# Patient Record
Sex: MALE | Race: BLACK OR AFRICAN AMERICAN | Employment: UNEMPLOYED | URBAN - METROPOLITAN AREA
[De-identification: names, ages, dates, MRNs, and addresses within clinical notes are randomized per-mention and may not be internally consistent; named-entity substitution may affect disease eponyms.]

---
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
## 2024-08-05 ENCOUNTER — HOSPITAL ENCOUNTER (INPATIENT)
Facility: HOSPITAL | Age: 24
LOS: 3 days | Discharge: HOME | End: 2024-08-09
Attending: EMERGENCY MEDICINE | Admitting: STUDENT IN AN ORGANIZED HEALTH CARE EDUCATION/TRAINING PROGRAM
Payer: COMMERCIAL

## 2024-08-05 DIAGNOSIS — F31.63 BIPOLAR 1 DISORDER, MIXED, SEVERE (HCC): ICD-10-CM

## 2024-08-05 DIAGNOSIS — F29 PSYCHOSIS, UNSPECIFIED PSYCHOSIS TYPE (HCC): ICD-10-CM

## 2024-08-05 DIAGNOSIS — F30.9 MANIA (HCC): Primary | ICD-10-CM

## 2024-08-05 DIAGNOSIS — R45.851 SUICIDAL IDEATION: ICD-10-CM

## 2024-08-05 DIAGNOSIS — R74.01 TRANSAMINITIS: ICD-10-CM

## 2024-08-05 LAB
ALBUMIN SERPL-MCNC: 4.6 G/DL (ref 3.5–5)
ALBUMIN/GLOB SERPL: 1.3 (ref 1.1–2.2)
ALP SERPL-CCNC: 143 U/L (ref 45–117)
ALT SERPL-CCNC: 165 U/L (ref 12–78)
AMPHET UR QL SCN: NEGATIVE
ANION GAP SERPL CALC-SCNC: 15 MMOL/L (ref 5–15)
APAP SERPL-MCNC: <2 UG/ML (ref 10–30)
APPEARANCE UR: CLEAR
AST SERPL-CCNC: 125 U/L (ref 15–37)
BACTERIA URNS QL MICRO: NEGATIVE /HPF
BARBITURATES UR QL SCN: NEGATIVE
BASOPHILS # BLD: 0 K/UL (ref 0–0.1)
BASOPHILS NFR BLD: 0 % (ref 0–1)
BENZODIAZ UR QL: NEGATIVE
BILIRUB SERPL-MCNC: 0.7 MG/DL (ref 0.2–1)
BILIRUB UR QL: NEGATIVE
BUN SERPL-MCNC: 13 MG/DL (ref 6–20)
BUN/CREAT SERPL: 10 (ref 12–20)
CALCIUM SERPL-MCNC: 9.4 MG/DL (ref 8.5–10.1)
CANNABINOIDS UR QL SCN: POSITIVE
CHLORIDE SERPL-SCNC: 103 MMOL/L (ref 97–108)
CO2 SERPL-SCNC: 25 MMOL/L (ref 21–32)
COCAINE UR QL SCN: NEGATIVE
COLOR UR: ABNORMAL
CREAT SERPL-MCNC: 1.31 MG/DL (ref 0.7–1.3)
DIFFERENTIAL METHOD BLD: NORMAL
EOSINOPHIL # BLD: 0 K/UL (ref 0–0.4)
EOSINOPHIL NFR BLD: 0 % (ref 0–7)
EPITH CASTS URNS QL MICRO: ABNORMAL /LPF
ERYTHROCYTE [DISTWIDTH] IN BLOOD BY AUTOMATED COUNT: 12.9 % (ref 11.5–14.5)
ETHANOL SERPL-MCNC: 82 MG/DL (ref 0–0.08)
GLOBULIN SER CALC-MCNC: 3.5 G/DL (ref 2–4)
GLUCOSE SERPL-MCNC: 65 MG/DL (ref 65–100)
GLUCOSE UR STRIP.AUTO-MCNC: NEGATIVE MG/DL
HCT VFR BLD AUTO: 42.4 % (ref 36.6–50.3)
HGB BLD-MCNC: 14.7 G/DL (ref 12.1–17)
HGB UR QL STRIP: NEGATIVE
IMM GRANULOCYTES # BLD AUTO: 0 K/UL (ref 0–0.04)
IMM GRANULOCYTES NFR BLD AUTO: 0 % (ref 0–0.5)
KETONES UR QL STRIP.AUTO: ABNORMAL MG/DL
LEUKOCYTE ESTERASE UR QL STRIP.AUTO: NEGATIVE
LYMPHOCYTES # BLD: 1.1 K/UL (ref 0.8–3.5)
LYMPHOCYTES NFR BLD: 24 % (ref 12–49)
Lab: ABNORMAL
MCH RBC QN AUTO: 28.2 PG (ref 26–34)
MCHC RBC AUTO-ENTMCNC: 34.7 G/DL (ref 30–36.5)
MCV RBC AUTO: 81.4 FL (ref 80–99)
METHADONE UR QL: NEGATIVE
MONOCYTES # BLD: 0.5 K/UL (ref 0–1)
MONOCYTES NFR BLD: 11 % (ref 5–13)
NEUTS SEG # BLD: 3 K/UL (ref 1.8–8)
NEUTS SEG NFR BLD: 65 % (ref 32–75)
NITRITE UR QL STRIP.AUTO: NEGATIVE
NRBC # BLD: 0 K/UL (ref 0–0.01)
NRBC BLD-RTO: 0 PER 100 WBC
OPIATES UR QL: NEGATIVE
PCP UR QL: NEGATIVE
PH UR STRIP: 6.5 (ref 5–8)
PLATELET # BLD AUTO: 290 K/UL (ref 150–400)
PMV BLD AUTO: 9.6 FL (ref 8.9–12.9)
POTASSIUM SERPL-SCNC: 3.5 MMOL/L (ref 3.5–5.1)
PROT SERPL-MCNC: 8.1 G/DL (ref 6.4–8.2)
PROT UR STRIP-MCNC: ABNORMAL MG/DL
RBC # BLD AUTO: 5.21 M/UL (ref 4.1–5.7)
RBC #/AREA URNS HPF: ABNORMAL /HPF (ref 0–5)
SALICYLATES SERPL-MCNC: 2 MG/DL (ref 2.8–20)
SODIUM SERPL-SCNC: 143 MMOL/L (ref 136–145)
SP GR UR REFRACTOMETRY: 1.02
URINE CULTURE IF INDICATED: ABNORMAL
UROBILINOGEN UR QL STRIP.AUTO: 1 EU/DL (ref 0.2–1)
WBC # BLD AUTO: 4.6 K/UL (ref 4.1–11.1)
WBC URNS QL MICRO: ABNORMAL /HPF (ref 0–4)

## 2024-08-05 PROCEDURE — 80307 DRUG TEST PRSMV CHEM ANLYZR: CPT

## 2024-08-05 PROCEDURE — 82077 ASSAY SPEC XCP UR&BREATH IA: CPT

## 2024-08-05 PROCEDURE — 80053 COMPREHEN METABOLIC PANEL: CPT

## 2024-08-05 PROCEDURE — 81001 URINALYSIS AUTO W/SCOPE: CPT

## 2024-08-05 PROCEDURE — 90715 TDAP VACCINE 7 YRS/> IM: CPT | Performed by: EMERGENCY MEDICINE

## 2024-08-05 PROCEDURE — 85025 COMPLETE CBC W/AUTO DIFF WBC: CPT

## 2024-08-05 PROCEDURE — 6360000002 HC RX W HCPCS: Performed by: EMERGENCY MEDICINE

## 2024-08-05 PROCEDURE — 6370000000 HC RX 637 (ALT 250 FOR IP): Performed by: EMERGENCY MEDICINE

## 2024-08-05 PROCEDURE — 90471 IMMUNIZATION ADMIN: CPT | Performed by: EMERGENCY MEDICINE

## 2024-08-05 PROCEDURE — 99285 EMERGENCY DEPT VISIT HI MDM: CPT

## 2024-08-05 PROCEDURE — 80143 DRUG ASSAY ACETAMINOPHEN: CPT

## 2024-08-05 PROCEDURE — 80179 DRUG ASSAY SALICYLATE: CPT

## 2024-08-05 RX ORDER — GINSENG 100 MG
CAPSULE ORAL
Status: COMPLETED | OUTPATIENT
Start: 2024-08-05 | End: 2024-08-05

## 2024-08-05 RX ORDER — OLANZAPINE 5 MG/1
10 TABLET ORAL ONCE
Status: COMPLETED | OUTPATIENT
Start: 2024-08-05 | End: 2024-08-05

## 2024-08-05 RX ADMIN — OLANZAPINE 10 MG: 5 TABLET, FILM COATED ORAL at 18:16

## 2024-08-05 RX ADMIN — BACITRACIN: 500 OINTMENT TOPICAL at 20:52

## 2024-08-05 RX ADMIN — TETANUS TOXOID, REDUCED DIPHTHERIA TOXOID AND ACELLULAR PERTUSSIS VACCINE, ADSORBED 0.5 ML: 5; 2.5; 8; 8; 2.5 SUSPENSION INTRAMUSCULAR at 16:48

## 2024-08-05 ASSESSMENT — PAIN SCALES - GENERAL: PAINLEVEL_OUTOF10: 0

## 2024-08-05 ASSESSMENT — PAIN - FUNCTIONAL ASSESSMENT: PAIN_FUNCTIONAL_ASSESSMENT: 0-10
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
## 2024-08-05 NOTE — ED NOTES
Assumed constant observer responsibilities at this time. 1:1 constant observer to patient ratio maintained. Patient is present in treatment room 10 in bed.     Patient requires constant observation due to SI and requires q15 minute documentation. Environmental check performed; all ligature risk removed and primary RN notified of any environmental safety concerns.

## 2024-08-05 NOTE — ED NOTES
Pt presents ambulatory to ED with [place] under ECO complaining of suicidal ideation and auditory hallucinations. Pt also endorses Spiritism delusions. Pt reports he has intermittently been taking Zoloft. Pt endorses alcohol, marijuana, and prescription drug use. Pt calm and cooperative with RN during triage and assessment. Pt endorses suicidal ideation and states he has everything planned out but does not necessarily want to carry out plan. Pt is alert and oriented x 4, RR even and unlabored, skin is warm and dry. Assesment completed and pt updated on plan of care.     ECO originally executed by mother stating that patient has not been bathing self, he is manic, and has suicidal ideation.       Emergency Department Nursing Plan of Care       The Nursing Plan of Care is developed from the Nursing assessment and Emergency Department Attending provider initial evaluation.  The plan of care may be reviewed in the “ED Provider note”.    The Plan of Care was developed with the following considerations:   Patient / Family readiness to learn indicated by:verbalized understanding  Persons(s) to be included in education: patient  Barriers to Learning/Limitations:None    Signed
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
## 2024-08-06 PROBLEM — F29 PSYCHOSIS, UNSPECIFIED PSYCHOSIS TYPE (HCC): Status: RESOLVED | Noted: 2024-08-06 | Resolved: 2024-08-06

## 2024-08-06 PROBLEM — F31.63 BIPOLAR 1 DISORDER, MIXED, SEVERE (HCC): Status: ACTIVE | Noted: 2024-08-06

## 2024-08-06 PROBLEM — F29 PSYCHOSIS, UNSPECIFIED PSYCHOSIS TYPE (HCC): Status: ACTIVE | Noted: 2024-08-06

## 2024-08-06 PROCEDURE — 99223 1ST HOSP IP/OBS HIGH 75: CPT | Performed by: PSYCHIATRY & NEUROLOGY

## 2024-08-06 PROCEDURE — 6370000000 HC RX 637 (ALT 250 FOR IP): Performed by: PSYCHIATRY & NEUROLOGY

## 2024-08-06 PROCEDURE — 6370000000 HC RX 637 (ALT 250 FOR IP)

## 2024-08-06 PROCEDURE — 1240000000 HC EMOTIONAL WELLNESS R&B

## 2024-08-06 RX ORDER — DIPHENHYDRAMINE HYDROCHLORIDE 50 MG/ML
50 INJECTION INTRAMUSCULAR; INTRAVENOUS EVERY 4 HOURS PRN
Status: DISCONTINUED | OUTPATIENT
Start: 2024-08-06 | End: 2024-08-09 | Stop reason: HOSPADM

## 2024-08-06 RX ORDER — POLYETHYLENE GLYCOL 3350 17 G/17G
17 POWDER, FOR SOLUTION ORAL DAILY PRN
Status: DISCONTINUED | OUTPATIENT
Start: 2024-08-06 | End: 2024-08-09 | Stop reason: HOSPADM

## 2024-08-06 RX ORDER — VALPROIC ACID 250 MG/5ML
500 SOLUTION ORAL 2 TIMES DAILY
Status: DISCONTINUED | OUTPATIENT
Start: 2024-08-06 | End: 2024-08-09 | Stop reason: HOSPADM

## 2024-08-06 RX ORDER — ACETAMINOPHEN 325 MG/1
650 TABLET ORAL EVERY 4 HOURS PRN
Status: DISCONTINUED | OUTPATIENT
Start: 2024-08-06 | End: 2024-08-09 | Stop reason: HOSPADM

## 2024-08-06 RX ORDER — POLYETHYLENE GLYCOL 3350 17 G
2 POWDER IN PACKET (EA) ORAL
Status: DISCONTINUED | OUTPATIENT
Start: 2024-08-06 | End: 2024-08-09 | Stop reason: HOSPADM

## 2024-08-06 RX ORDER — TRAZODONE HYDROCHLORIDE 50 MG/1
50 TABLET ORAL NIGHTLY PRN
Status: DISCONTINUED | OUTPATIENT
Start: 2024-08-06 | End: 2024-08-09 | Stop reason: HOSPADM

## 2024-08-06 RX ORDER — RISPERIDONE 1 MG/ML
1 SOLUTION ORAL 2 TIMES DAILY
Status: DISCONTINUED | OUTPATIENT
Start: 2024-08-06 | End: 2024-08-09 | Stop reason: HOSPADM

## 2024-08-06 RX ORDER — HALOPERIDOL 5 MG/ML
5 INJECTION INTRAMUSCULAR EVERY 4 HOURS PRN
Status: DISCONTINUED | OUTPATIENT
Start: 2024-08-06 | End: 2024-08-09 | Stop reason: HOSPADM

## 2024-08-06 RX ORDER — LORAZEPAM 2 MG/ML
1 CONCENTRATE ORAL 2 TIMES DAILY
Status: DISCONTINUED | OUTPATIENT
Start: 2024-08-06 | End: 2024-08-09 | Stop reason: HOSPADM

## 2024-08-06 RX ORDER — HYDROXYZINE HYDROCHLORIDE 25 MG/1
50 TABLET, FILM COATED ORAL 3 TIMES DAILY PRN
Status: DISCONTINUED | OUTPATIENT
Start: 2024-08-06 | End: 2024-08-09 | Stop reason: HOSPADM

## 2024-08-06 RX ORDER — HALOPERIDOL 5 MG/1
5 TABLET ORAL EVERY 4 HOURS PRN
Status: DISCONTINUED | OUTPATIENT
Start: 2024-08-06 | End: 2024-08-09 | Stop reason: HOSPADM

## 2024-08-06 RX ORDER — MAGNESIUM HYDROXIDE/ALUMINUM HYDROXICE/SIMETHICONE 120; 1200; 1200 MG/30ML; MG/30ML; MG/30ML
30 SUSPENSION ORAL EVERY 6 HOURS PRN
Status: DISCONTINUED | OUTPATIENT
Start: 2024-08-06 | End: 2024-08-09 | Stop reason: HOSPADM

## 2024-08-06 RX ADMIN — NICOTINE POLACRILEX 2 MG: 2 LOZENGE ORAL at 19:50

## 2024-08-06 RX ADMIN — RISPERIDONE 1 MG: 1 SOLUTION ORAL at 11:22

## 2024-08-06 RX ADMIN — LORAZEPAM 1 MG: 2 SOLUTION, CONCENTRATE ORAL at 20:48

## 2024-08-06 RX ADMIN — VALPROIC ACID 500 MG: 250 SOLUTION ORAL at 20:48

## 2024-08-06 RX ADMIN — TRAZODONE HYDROCHLORIDE 50 MG: 50 TABLET ORAL at 20:48

## 2024-08-06 RX ADMIN — RISPERIDONE 1 MG: 1 SOLUTION ORAL at 20:48

## 2024-08-06 RX ADMIN — VALPROIC ACID 500 MG: 250 SOLUTION ORAL at 11:21

## 2024-08-06 RX ADMIN — LORAZEPAM 1 MG: 2 SOLUTION, CONCENTRATE ORAL at 11:22

## 2024-08-06 ASSESSMENT — LIFESTYLE VARIABLES
HOW MANY STANDARD DRINKS CONTAINING ALCOHOL DO YOU HAVE ON A TYPICAL DAY: 1 OR 2
HOW OFTEN DO YOU HAVE A DRINK CONTAINING ALCOHOL: 2-3 TIMES A WEEK

## 2024-08-06 ASSESSMENT — SLEEP AND FATIGUE QUESTIONNAIRES
SLEEP PATTERN: DIFFICULTY FALLING ASLEEP
DO YOU USE A SLEEP AID: NO
DO YOU HAVE DIFFICULTY SLEEPING: YES
AVERAGE NUMBER OF SLEEP HOURS: 5

## 2024-08-06 ASSESSMENT — PAIN SCALES - GENERAL: PAINLEVEL_OUTOF10: 0

## 2024-08-06 NOTE — GROUP NOTE
Group Therapy Note    Date: 8/6/2024    Group Start Time: 1500  Group End Time: 1600  Group Topic: Recreational    [facility] ACUTE BEHAV HLTH    Brianna Geiger        Group Therapy Note    Attendees: 6       Patient's Goal:  To concentrate on selected task    Notes:  Pt did not attend session    Discipline Responsible: Recreational Therapist      Signature:  BRIANNA GEIGER

## 2024-08-06 NOTE — ED NOTES
Called bed board to update them on pt's vitals. Pt awake in room and appears upset that he is having to stay.
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
## 2024-08-06 NOTE — PROGRESS NOTES
Laboratory Monitoring for Mood Stabilizers and Antipsychotics    Recommended baseline monitoring has not been completed based on this patient's current medication regimen.    The following labs have been ordered to complete baseline monitoring:  Lipid Panel, Hemoglobin A1c, TSH. Labs will be drawn on 8/12 with VPA level to minimize blood draws.      This patient is currently prescribed the following medication(s):   Current Facility-Administered Medications: acetaminophen (TYLENOL) tablet 650 mg, 650 mg, Oral, Q4H PRN  polyethylene glycol (GLYCOLAX) packet 17 g, 17 g, Oral, Daily PRN  aluminum & magnesium hydroxide-simethicone (MAALOX) 200-200-20 MG/5ML suspension 30 mL, 30 mL, Oral, Q6H PRN  hydrOXYzine HCl (ATARAX) tablet 50 mg, 50 mg, Oral, TID PRN  haloperidol (HALDOL) tablet 5 mg, 5 mg, Oral, Q4H PRN **OR** haloperidol lactate (HALDOL) injection 5 mg, 5 mg, IntraMUSCular, Q4H PRN  diphenhydrAMINE (BENADRYL) injection 50 mg, 50 mg, IntraMUSCular, Q4H PRN  traZODone (DESYREL) tablet 50 mg, 50 mg, Oral, Nightly PRN  nicotine polacrilex (COMMIT) lozenge 2 mg, 2 mg, Oral, Q1H PRN  risperiDONE (RisperDAL) 1 MG/ML oral solution 1 mg, 1 mg, Oral, BID  LORazepam (ATIVAN) 2 MG/ML concentrated solution 1 mg, 1 mg, Oral, BID  valproic acid (DEPAKENE) 250 MG/5ML oral solution 500 mg, 500 mg, Oral, BID    The following labs have been completed for monitoring of antipsychotics and/or mood stabilizers:    Height, Weight, BMI Estimation  Estimated body mass index is 20.22 kg/m² as calculated from the following:    Height as of this encounter: 1.803 m (5' 11\").    Weight as of this encounter: 65.8 kg (145 lb).     Vital Signs/Blood Pressure  BP (!) 116/58   Pulse 59   Temp 97.5 °F (36.4 °C) (Oral)   Resp 16   Ht 1.803 m (5' 11\")   Wt 65.8 kg (145 lb)   SpO2 100%   BMI 20.22 kg/m²     Renal Function, Hepatic Function and Chemistry  Estimated Creatinine Clearance: 82 mL/min (A) (based on SCr of 1.31 mg/dL (H)).    Lab

## 2024-08-06 NOTE — CARE COORDINATION
08/06/24 0855   Suicidal Ideation   Wish to be Dead Lifetime - Yes;Past 1 month - Yes   Non-Specific Active Suicidal Thoughts Lifetime - Yes;Past 1 month - Yes   Suicidal Behavior Trigger No Selected   Active Suicidal Ideation with Any Methods (Not Plan) without Intent to Act Lifetime - No;Past 1 month - No   Active Suicidal Ideation with Some Intent to Act, without Specific Plan Lifetime - No;Past 1 month - No   Active Suicidal Ideation with Specific Plan and Intent Past 1 month - No;Lifetime- No   Intensity of Ideation   Lifetime - Most Severe Ideation 4   Lifetime - Most Recent Ideation 3   Frequency Once a week   Duration Less than 1 hour/some of the time   Controllability Can control thoughts with some difficulty   Deterrents Uncertain that deterrents stopped you   Reasons for Ideation Mostly to end or stop the pain   Suicidal Behavior   Actual Attempt Lifetime - No;Past 3 months - No   Has subject engaged in Non-Suicidal Self-Injurious Behavior? Past 3 months - Yes;Lifetime - Yes   Interrupted Attempt Lifetime - No;Past 3 months - No   Aborted or Self-Interrupted Attempt Past 3 months - No;Lifetime - No   Preparatory Acts or Behavior Lifetime - No;Past 3 months - No

## 2024-08-06 NOTE — GROUP NOTE
Group Therapy Note    Date: 8/6/2024    Group Start Time: 1100  Group End Time: 1200  Group Topic: Relaxation    [facility] 3 ACUTE BEHAV [facility]    Brianna Geiger        Group Therapy Note    Attendees: 9       Patient's Goal:  To participate in relaxation activity      Status After Intervention:  Improved    Participation Level: Active Listener and Interactive    Participation Quality: Appropriate, Attentive, and Sharing      Speech:  normal      Thought Process/Content: Logical      Affective Functioning: Congruent      Level of consciousness:  Alert, Oriented x4, and Attentive      Response to Learning: Progressing to goal      Endings: None Reported    Modes of Intervention: Activity      Discipline Responsible: Recreational Therapist      Signature:  BRIANNA GEIGER

## 2024-08-06 NOTE — BH NOTE
Day Shift Assessment ([id number]):    Writer met patient in his room. Patient noted to be resting quietly, asleep in bed. Patient appears to be free of distress. Patient groggy, but easily aroused upon prompting. Patient minimally cooperative, falling asleep during assessment. Patient A/O x 4 with clear speech and steady ambulation when out of bed. Patient presents with an elevated expression/affect and anxious mood. Patient denies depression, SI, HI, and VH. Patient endorses AH described as hearing his name. Patient scored \"No Risk\" on C-SSRS for this shift. Patient's speech is pressured, thought process tangential. Patient is meal and medication compliant patient visible in the milieu, social with staff and peers. Patient is discharge focused, minimally receptive to education on TDO process. Patient repeatedly demands to have his rights honored. Writer attempted to educate patient on [facility] policies. No evidence of learning.VS stable. Plan of care in place. Q 15 minute checks in place for safety.

## 2024-08-06 NOTE — CARE COORDINATION
08/06/24 0856   ITP   Date of Plan 08/06/24   Primary Diagnosis Code Psychosis, unspecified psychosis type   Barriers to Treatment Need for psychoeducation;Psychiatric symptom (comment);Client resistance   Strengths Incorporated in Plan Support network;Verbal;Family supports   Plan of Care   Long Term Goal (LTG) Stated in patient/guardian terms Maintain safety and stability in the community   Short Term Goal 1   Short Term Goal 1 Client will reduce frequency and intensity of unsafe behavior   Baseline Functioning Client insight and judgement is psychologically impaired and he is engaging in self injurous behavior   Target Reduction in self-injurious behavior and improvement in insight and judgement   Objectives Client will participate in group therapy;Other (comment)  (Client will participate in treatment team)   Intervention 1 Milieu therapy and support   Frequency On going   Measured by Staff observation;Self report   Staff Responsible Clinical staff;[facility] staff   Intervention 2 Monitor medications   Frequency On going   Measured by Staff observation;Self report   Staff Responsible Clinical staff;[facility] staff   STG Goal 1 Status: Patient Appears to be  Treatment plan goal is unmet at this time  (Client judgement and insight remains severely psychologically impaired)   Short Term Goal 2   Short Term Goal 2 Client will learn and demonstrate effective coping skills   Baseline Functioning Client is experiencing SI due to hallucinations   Target Reduction in ideation through the use of coping skills   Objectives Client will participate in group therapy;Other (comment)  (Client will participate in treatment team)   Intervention 1 Milieu therapy and support   Frequency On going   Measured by Staff observation;Self report   Staff Responsible Clinical staff;[facility] staff   Intervention 2 Referral to community services   Frequency Prior to discharge   Measured by Staff observation;Self report   Staff Responsible Clinical  staff;[facility] staff   STG Goal 2 Status: Patient Appears to be  Treatment plan goal is unmet at this time  (Client judgement and insight are impaired and client is unable to use healthy coping skills top cope)   Crisis/Safety/Discharge Plan   Crisis/Safety Plan Standard program interventions and protocol   Comprehensive Assessment Completion Date 08/06/24   Discharge Plan Home     CROW Gandhi

## 2024-08-06 NOTE — BH NOTE
[facility]  Admission Note     Patient identifies as non-binary, stated preferred pronouns are they, he, and she.     Admission Type:    Patient admitted as TDO.     Reason for admission:   Patient states his neighbors called the police on them when they had an argument with their wife. Per EMR patient was brought to the ER under ECO after poor eating and sleeping, texting girlfriend excessively, and punching a hole in their bedroom wall as reported by mother.     UDS positive for THC. BAL 82 at 8/5/24 1747.    Patient endorses SI, denies HI, and visual hallucination. Endorsed auditory hallucinations of his name being called in music. Patient presented as cooperative. Patient thought process circumstantial, loose association, and disorganized. Patient endorsed taking Zoloft 100mg and \"weaning self off\", and stated that he was not under the care of a provider.           Medical Problems:   Patient endorses history of asthma.     Status EXAM:  Mental Status and Behavioral Exam  Normal: No  Level of Assistance: Independent/Self  Facial Expression: Exaggerated  Affect: Appropriate  Level of Consciousness: Alert  Frequency of Checks: 4 times per hour, close  Mood:Normal: Yes  Motor Activity:Normal: Yes  Eye Contact: Good  Observed Behavior: Cooperative, Impulsive  Sexual Misconduct History: Current - no  Preception: [place] to person, [place] to place, [place] to situation, [place] to time  Attention:Normal: Yes  Thought Processes: Circumstantial, Loose association  Thought Content:Normal: Yes  Depression Symptoms: Sleep disturbance  Anxiety Symptoms: Generalized  Sharon Symptoms: No problems reported or observed.  Hallucinations: Auditory (comment)  Delusions: No  Memory:Normal: Yes  Insight and Judgment: No  Insight and Judgment: Poor judgment, Poor insight    Pt admitted with followings belongings:  Dental Appliances: None  Vision - Corrective Lenses: None  Hearing Aid: None  Jewelry: Necklace  Body  Piercings Removed: N/A  Clothing: Undergarments, Shirt, Pants, Footwear  Other Valuables: Other (Comment)    Skin Assessment completed by TANIA Fernandez and TANIA Gray. Old scars present on L shoulder, bilateral lower legs, L elbow, L elbow, and L thigh. New superficial abrasion present on R bicep, patient states from hitting a door on 8/5.      Valuables placed in safe in security envelope. Patient oriented to surroundings and program expectations and copy of patient rights given. Received admission packet. Patient verbalize understanding.

## 2024-08-06 NOTE — ED NOTES
Cords, bag masks and wires removed from room. Waist  beads removed from pt due to ligature risk and placed in pt belonging bags.

## 2024-08-06 NOTE — ED NOTES
TRANSFER - OUT REPORT:    Verbal report given to Jim MARIN on Isacc Boone  being transferred to U 308 for routine progression of patient care       Report consisted of patient's Situation, Background, Assessment and   Recommendations(SBAR).     Information from the following report(s) Nurse Handoff Report, ED Encounter Summary, ED SBAR, MAR, and Recent Results was reviewed with the receiving nurse.    Sharples Fall Assessment:    Presents to emergency department  because of falls (Syncope, seizure, or loss of consciousness): No  Age > 70: No  Altered Mental Status, Intoxication with alcohol or substance confusion (Disorientation, impaired judgment, poor safety awaremess, or inability to follow instructions): No  Impaired Mobility: Ambulates or transfers with assistive devices or assistance; Unable to ambulate or transer.: No  Nursing Judgement: No          Lines:       Opportunity for questions and clarification was provided.      Patient transported with:  security

## 2024-08-06 NOTE — PLAN OF CARE
Problem: Discharge Planning  Goal: Discharge to home or other facility with appropriate resources  8/6/2024 1310 by Argenis Walker, RN  Outcome: Not Progressing  8/6/2024 0502 by Marina Costa, RN  Outcome: Progressing     Problem: Behavior  Goal: Pt/Family maintain appropriate behavior and adhere to behavioral management agreement, if implemented  Description: INTERVENTIONS:  1. Assess patient/family's coping skills and  non-compliant behavior (including use of illegal substances)  2. Notify security of behavior or suspected illegal substances which indicate the need for search of the family and/or belongings  3. Encourage verbalization of thoughts and concerns in a socially appropriate manner  4. Utilize positive, consistent limit setting strategies supporting safety of patient, staff and others  5. Encourage participation in the decision making process about the behavioral management agreement  6. If a visitor's behavior poses a threat to safety call refer to organization policy.  7. Initiate consult with , Psychosocial CNS, Spiritual Care as appropriate  Outcome: Not Progressing     Problem: Sharon  Goal: Will exhibit normal sleep and speech and no impulsivity  Description: INTERVENTIONS:  1. Administer medication as ordered  2. Set limits on impulsive behavior  3. Make attempts to decrease external stimuli as possible  Outcome: Not Progressing     Problem: Psychosis  Goal: Will report no hallucinations or delusions  Description: INTERVENTIONS:  1. Administer medication as  ordered  2. Assist with reality testing to support increasing orientation  3. Assess if patient's hallucinations or delusions are encouraging self harm or harm to others and intervene as appropriate  Outcome: Not Progressing

## 2024-08-06 NOTE — ED NOTES
Pt upset that he cannot use cell phone to call girlfriend. Pt saw gf and mother earlier this shift. RN says that they know he is here and safe. RN offered to call and leave a message, but pt does not have their phone numbers memorized. This RN looked through chart to find them, but could not.

## 2024-08-06 NOTE — H&P
INITIAL PSYCHIATRIC EVALUATION            IDENTIFICATION:    Patient Name  Isacc Boone   Date of Birth 2000   [facility] [id number]   Medical Record Number  [id number]      Age  24 y.o.   PCP Alena Burnette MD   Admit date:  8/5/2024    Room Number  308/01  @ [facility]   Date of Service  8/6/2024            HISTORY         REASON FOR HOSPITALIZATION:  CC: \"psychosis\". Pt admitted under a temporary intermediate order (TDO) for suicidal ideations and severe psychosis proving to be an imminent danger to self and an inability to care for self.    HISTORY OF PRESENT ILLNESS:    The patient, Isacc Boone, is a 24 y.o.  Black /  male with no past psychiatric history (a voiced history of borderline and antisocial personality disorder), who presents at this time with complaints of (and/or evidence of) the following emotional symptoms: suicidal thoughts/threats, agitation, and delusions.  Additional symptomatology include thought process disorganization.  The above symptoms have been present for 2+ weeks. These symptoms are of moderate to high severity. These symptoms are constant in nature.  The patient's condition has been precipitated by psychosocial stressors.  Patient's condition made worse by continued psychoactive drug use and alcohol use as well as treatment noncompliance. UDS: +THC; BAL=82.     The patient presented to the ED with mood lability. He has been having difficulty in all areas of his life, and recently damaged property after a breakup. On the unit, the patient has been elevated, labile and with ongoing agitation. He speaks nonsensically and rapidly but is redirectable. Patient informed of his present status as a TDO and limited insight into his condition. He reports decreased need for sleep, sleeping 4-5 hours per night. The patient denies SI but acknowledges prior attempts in his adolescence.    The patient is a poor historian. The patient does not corroborates the  Final    Leukocyte Esterase, Urine 08/05/2024 Negative  NEG   Final    WBC, UA 08/05/2024 0-4  0 - 4 /hpf Final    RBC, UA 08/05/2024 0-5  0 - 5 /hpf Final    Epithelial Cells, UA 08/05/2024 FEW  FEW /lpf Final    BACTERIA, URINE 08/05/2024 Negative  NEG /hpf Final    Urine Culture if Indicated 08/05/2024 CULTURE NOT INDICATED BY UA RESULT  CNI   Final          MEDICATIONS     SCHEDULED MEDICATIONS            ASSESSMENT & PLAN     The patient, Isacc Boone, is a 24 y.o.  male who presents at this time for treatment of the following diagnoses:  Bipolar 1 disorder, mixed, severe (HCC)  ASSESSMENT: the patient presents with significant mood lability and worsening of his thought process in the setting of THC and EtOH; family history significant for schizophrenia in his sister. Suspect mixed episode of bipolar disorder with psychotic features. Will treat symptomatically.   PLAN:  - Observe off substances  - START Risperdal oral soln 1 mg BID for psychosis  - START Depakene 500 mg BID for mood lability (15 mg/kg)  - START Ativan oral soln 1 mg BID for agitation  - Family can visit  - IGM therapy as tolerated  - Expand database / obtain collateral  - Dispo planning (home when stable)       I will obtain labwork for all medications for which routine monitoring is recommended and agents that require intense monitoring for toxicity as deemed appropriate based on current medication side effects and pharmacodynamically determined drug 1/2 lives.    A coordinated, multidisplinary treatment team (includes the nurse, unit pharmacist,  and writer) round was conducted for this initial evaluation with the patient present.     The following regarding medications was addressed during rounds with patient: the risks and benefits of the proposed medication. The patient was given the opportunity to ask questions. Informed consent given to the use of the above medications.    I will continue to adjust psychiatric and

## 2024-08-06 NOTE — PROGRESS NOTES
[facility] Admission Pharmacy Medication Reconciliation    Information obtained from: RxQuery data, patient during rounds  RxQuery data available1: Yes     Comments/recommendations:  Patient is a poor historian. No medication dispense history found per Rx[facility] and [facility] .     Medication changes (since last review):  Added  None  Removed  None  Adjusted  None     1RxQuery pharmacy benefit data reflects medications filled and processed through the patient's insurance, however                this data does NOT capture whether the medication was picked up or is currently being taken by the patient.     Patient allergies:   Allergies as of 08/05/2024 - Fully Reviewed 08/05/2024   Allergen Reaction Noted    Coconut (cocos nucifera)  08/05/2024    Other  08/05/2024    Amoxicillin-pot clavulanate Nausea And Vomiting 03/16/2018     Thank you,  Kira Wu [facility]

## 2024-08-07 PROCEDURE — 6370000000 HC RX 637 (ALT 250 FOR IP)

## 2024-08-07 PROCEDURE — 1240000000 HC EMOTIONAL WELLNESS R&B

## 2024-08-07 PROCEDURE — 6370000000 HC RX 637 (ALT 250 FOR IP): Performed by: PSYCHIATRY & NEUROLOGY

## 2024-08-07 PROCEDURE — 99232 SBSQ HOSP IP/OBS MODERATE 35: CPT | Performed by: PSYCHIATRY & NEUROLOGY

## 2024-08-07 RX ADMIN — VALPROIC ACID 500 MG: 250 SOLUTION ORAL at 21:05

## 2024-08-07 RX ADMIN — NICOTINE POLACRILEX 2 MG: 2 LOZENGE ORAL at 17:41

## 2024-08-07 RX ADMIN — RISPERIDONE 1 MG: 1 SOLUTION ORAL at 21:05

## 2024-08-07 RX ADMIN — RISPERIDONE 1 MG: 1 SOLUTION ORAL at 08:27

## 2024-08-07 RX ADMIN — LORAZEPAM 1 MG: 2 SOLUTION, CONCENTRATE ORAL at 21:05

## 2024-08-07 RX ADMIN — VALPROIC ACID 500 MG: 250 SOLUTION ORAL at 08:26

## 2024-08-07 RX ADMIN — TRAZODONE HYDROCHLORIDE 50 MG: 50 TABLET ORAL at 21:05

## 2024-08-07 RX ADMIN — NICOTINE POLACRILEX 2 MG: 2 LOZENGE ORAL at 09:43

## 2024-08-07 RX ADMIN — LORAZEPAM 1 MG: 2 SOLUTION, CONCENTRATE ORAL at 08:27

## 2024-08-07 NOTE — PROGRESS NOTES
RN Shift Assessment Note 7pm-7am:    Isacc was met in the hallway where he was observed socializing with his peers, laughing and joking with them. Pt was observed to be visible and social during this shift.    Pt denies pain, SI, HI, AVH, depression and anxiety. Pt stated that his mood is happy but frustrated. Pt stated he feels frustrated that he found out that his mother had him committed here to the hospital and that he just wanted to talk to a therapist. He also stated that he wanted to wear his own personal clothing. It was explained to him that he will receive it after 24 hrs from admission on the unit. Pt was agreeable with his process, Pt was calm and cooperative during assessment. Pt is medication compliant. Pt received PRN Trazodone for sleep aid per Pt request. Pt was did not display any signs of distress or complaints of adverse reactions to  medications. Pt will continue to be monitored for his safety. Pt slept for a total of  9 hrs and 45 mins.

## 2024-08-07 NOTE — BH NOTE
Pt met with the court today for his TDO hearing, pt volunteered from court after his hearing 08/07/24.

## 2024-08-07 NOTE — GROUP NOTE
Group Therapy Note    Date: 8/7/2024    Group Start Time: 1100  Group End Time: 1200  Group Topic: Relaxation    [facility] 3 ACUTE BEHAV [facility]    Brianna Geiger        Group Therapy Note    Attendees: 7       Patient's Goal:  To participate in relaxation activity    Notes:  Pt did not attend session    Discipline Responsible: Recreational Therapist      Signature:  BRIANNA GEIGER

## 2024-08-07 NOTE — BH NOTE
Writer encountered patient in bedroom.  Morning assessment complete.   Patient is calm and cooperative.   Eye contact is noted to be good.    Mood is noted to be normal.  Affect is noted to be appropriate.  Patient has been isolative to room.  Patient denies anxiety ad depression.  Patient denies SI/HI/AVH.    Patient HR elevated. MD notified.    Patient is both med and meal compliant. There are no untoward side effects from medications to note.     Patient stated that last bowel movent was yesterday.    C-SSRS level is no risk.    Patient requested and received PRN Nicotine Lozenge at 0943 and 1741.    Hourly rounds are being completed to assure patient safety and attend to care needs. Monitoring will continue for changes in patient condition.

## 2024-08-07 NOTE — PLAN OF CARE
Problem: Behavior  Goal: Pt/Family maintain appropriate behavior and adhere to behavioral management agreement, if implemented  Description: INTERVENTIONS:  1. Assess patient/family's coping skills and  non-compliant behavior (including use of illegal substances)  2. Notify security of behavior or suspected illegal substances which indicate the need for search of the family and/or belongings  3. Encourage verbalization of thoughts and concerns in a socially appropriate manner  4. Utilize positive, consistent limit setting strategies supporting safety of patient, staff and others  5. Encourage participation in the decision making process about the behavioral management agreement  6. If a visitor's behavior poses a threat to safety call refer to organization policy.  7. Initiate consult with , Psychosocial CNS, Spiritual Care as appropriate  8/7/2024 1056 by Katie Byrnes, RN  Outcome: Progressing

## 2024-08-07 NOTE — PLAN OF CARE
Problem: Discharge Planning  Goal: Discharge to home or other facility with appropriate resources  8/6/2024 2235 by Jim Goodrich RN  Outcome: Progressing  8/6/2024 1310 by Argenis Walker RN  Outcome: Not Progressing     Problem: Behavior  Goal: Pt/Family maintain appropriate behavior and adhere to behavioral management agreement, if implemented  Description: INTERVENTIONS:  1. Assess patient/family's coping skills and  non-compliant behavior (including use of illegal substances)  2. Notify security of behavior or suspected illegal substances which indicate the need for search of the family and/or belongings  3. Encourage verbalization of thoughts and concerns in a socially appropriate manner  4. Utilize positive, consistent limit setting strategies supporting safety of patient, staff and others  5. Encourage participation in the decision making process about the behavioral management agreement  6. If a visitor's behavior poses a threat to safety call refer to organization policy.  7. Initiate consult with , Psychosocial CNS, Spiritual Care as appropriate  8/6/2024 2235 by Jim Goodrich RN  Outcome: Progressing  8/6/2024 1310 by Argenis Walker RN  Outcome: Not Progressing     Problem: Drug Abuse/Detox  Goal: Will have no detox symptoms and will verbalize plan for changing drug-related behavior  Description: INTERVENTIONS:  1. Administer medication as ordered  2. Monitor physical status  3. Provide emotional support with 1:1 interaction with staff  4. Encourage  recovery focused treatment   8/6/2024 1310 by Argenis Walker RN  Outcome: Progressing     Problem: Sharon  Goal: Will exhibit normal sleep and speech and no impulsivity  Description: INTERVENTIONS:  1. Administer medication as ordered  2. Set limits on impulsive behavior  3. Make attempts to decrease external stimuli as possible  8/6/2024 1310 by Argenis Walker RN  Outcome: Not Progressing     Problem: Psychosis  Goal: Will report no

## 2024-08-07 NOTE — BH NOTE
Behavioral Health Interdisciplinary Rounds     Patient Name: Isacc Boone Age: 24 y.o. Room/Bed:  308/01  Primary Diagnosis: Bipolar 1 disorder, mixed, severe (HCC)  Admission Status: TDO     Readmission within 30 days: No  Power of  in place: No  Patient requires a blocked bed: No          Reason for blocked bed: n/a    Sleep hours: 9.75       Participation in Care/Groups:  No  Medication Compliant?: Yes  PRNS (last 24 hours): Sleep Aid and Nicotine     Restraints (last 24 hours):  No  Substance Abuse:  Yes    24 hour chart check complete: Yes    Patient goal(s) for today: Take medications as prescribed, , engage in unit activities, participate in hygiene/ADLs, and communicate needs to appropriate staff,   Treatment team focus/goals: MD adjust medications as appropriate, identify discharge planning needs, coordination of care,     Progress note:   Pt met with treatment team. Pt denies SI/HI/AVH. Pt still presents somewhat grandiose but less elevated. Pt thought process is more organized. Pt reports he understands the need to take medicine. Pt reports he does have insurance but cannot remember the information. SW reached out to pt mom for this information and she reports she will try to bring this information when they come visit.     Plan is to discharge pt home after scheduled labs are due next Monday     GIANCARLO to schedule therapy with a female provider once insurance information is provided.       837: Spoke to pt mom, she reports pt still sounds extremely disorganized, but somewhat calmer. She reports his behavior recently has been out of the ordinary. She reports that pt destroyed his room. Reports pt has been calling her and his girlfriend constantly and asking them to pick him up. She reports pt still does not seem to demonstrate insight into his situation.     LOS:  1  Expected LOS: 7    Insurance coverage:    Family contact:  Mom, Girlfriend      Family requesting physician contact today:

## 2024-08-07 NOTE — BH NOTE
E/M Psychiatric Progress Note    Patient: Isacc Boone MRN: [id number]  SSN: [id number]    YOB: 2000  Age: 24 y.o.  Sex: male      Admit Date: 8/5/2024    LOS: 1 day     Chief Complaint: Sharon    Subjective:     HPI / INTERVAL HISTORY:    The patient, Isacc Boone, is a 24 y.o.  Black /  male with no past psychiatric history (a voiced history of borderline and antisocial personality disorder), who presents at this time with complaints of (and/or evidence of) the following emotional symptoms: suicidal thoughts/threats, agitation, and delusions.  Additional symptomatology include thought process disorganization.  The above symptoms have been present for 2+ weeks. These symptoms are of moderate to high severity. These symptoms are constant in nature.  The patient's condition has been precipitated by psychosocial stressors.  Patient's condition made worse by continued psychoactive drug use and alcohol use as well as treatment noncompliance. UDS: +THC; BAL=82.      The patient presented to the ED with mood lability. He has been having difficulty in all areas of his life, and recently damaged property after a breakup. On the unit, the patient has been elevated, labile and with ongoing agitation. He speaks nonsensically and rapidly but is redirectable. Patient informed of his present status as a TDO and limited insight into his condition. He reports decreased need for sleep, sleeping 4-5 hours per night. The patient denies SI but acknowledges prior attempts in his adolescence.     The patient is a poor historian. The patient does not corroborates the above narrative. The patient contracts for safety on the unit and gives consent for the team to contact collateral. The patient is not amenable to initiating treatment while on the unit. He reports occasional drinking and daily THC use. Labs significant for transaminatis and TAYLOR.    08/07 - the patient has been visible, grandiose and  of his thought process in the setting of THC and EtOH; family history significant for schizophrenia in his sister. Suspect mixed episode of bipolar disorder with psychotic features. Will treat symptomatically.   PLAN:  - Observe off substances  - CONTINUE Risperdal oral soln 1 mg BID for psychosis  - CONTINUE Depakene 500 mg BID for mood lability (15 mg/kg)  - CONTINUE Ativan oral soln 1 mg BID for agitation  - Family can visit  - IGM therapy as tolerated  - Expand database / obtain collateral  - Dispo planning (home when stable)     I certify that this patient's inpatient psychiatric hospital services furnished since the previous certification were, and continue to be,required for treatment that could reasonably be expected to improve the patient's condition, or for diagnostic study, and that the patient continues to need, on a daily basis, active treatment furnished directly by or requiring the supervision of inpatient psychiatric facility personnel. In addition, the hospital records show that services furnished were intensive treatment services, admission or related services, or equivalent services.    Signed By: Ann Epps MD     August 7, 2024

## 2024-08-08 PROCEDURE — 6370000000 HC RX 637 (ALT 250 FOR IP)

## 2024-08-08 PROCEDURE — 99232 SBSQ HOSP IP/OBS MODERATE 35: CPT | Performed by: PSYCHIATRY & NEUROLOGY

## 2024-08-08 PROCEDURE — 6370000000 HC RX 637 (ALT 250 FOR IP): Performed by: PSYCHIATRY & NEUROLOGY

## 2024-08-08 PROCEDURE — 1240000000 HC EMOTIONAL WELLNESS R&B

## 2024-08-08 RX ADMIN — LORAZEPAM 1 MG: 2 SOLUTION, CONCENTRATE ORAL at 21:19

## 2024-08-08 RX ADMIN — NICOTINE POLACRILEX 2 MG: 2 LOZENGE ORAL at 18:51

## 2024-08-08 RX ADMIN — VALPROIC ACID 500 MG: 250 SOLUTION ORAL at 21:19

## 2024-08-08 RX ADMIN — RISPERIDONE 1 MG: 1 SOLUTION ORAL at 21:19

## 2024-08-08 RX ADMIN — NICOTINE POLACRILEX 2 MG: 2 LOZENGE ORAL at 14:37

## 2024-08-08 RX ADMIN — LORAZEPAM 1 MG: 2 SOLUTION, CONCENTRATE ORAL at 08:38

## 2024-08-08 RX ADMIN — RISPERIDONE 1 MG: 1 SOLUTION ORAL at 08:38

## 2024-08-08 RX ADMIN — VALPROIC ACID 500 MG: 250 SOLUTION ORAL at 08:38

## 2024-08-08 ASSESSMENT — PAIN SCALES - GENERAL
PAINLEVEL_OUTOF10: 0
PAINLEVEL_OUTOF10: 0

## 2024-08-08 NOTE — PROGRESS NOTES
RN Shift Assessment Note 7pm-7am:     Isacc was met in the day room where he was observed socializing with his peers, laughing and joking with them. Pt was observed to be visible and social during this shift.    Pt denies pain, SI, HI, AVH, depression and anxiety. Pt stated that his mood is happy. Pt was calm and cooperative during assessment. Pt is medication compliant. Pt received PRN Trazodone for sleep aid per Pt request. Pt did not display any signs of distress or complaints of adverse reactions to medications. Pt will continue to be monitored for his safety. Pt slept for a total of  8 hrs.

## 2024-08-08 NOTE — GROUP NOTE
Group Therapy Note    Date: 8/8/2024    Group Start Time: 1500  Group End Time: 1600  Group Topic: Recreational    [facility] ACUTE [facility]    Brianna Geiger        Group Therapy Note    Attendees: 7       Patient's Goal:  To concentrate on selected task    Notes:  Pt attended session,pleasant,cooperative-worked on selected task                                                            Discipline Responsible: Recreational Therapist      Signature:  BRIANNA GEIGER

## 2024-08-08 NOTE — PLAN OF CARE
Problem: Discharge Planning  Goal: Discharge to home or other facility with appropriate resources  Outcome: Progressing     Problem: Behavior  Goal: Pt/Family maintain appropriate behavior and adhere to behavioral management agreement, if implemented  Description: INTERVENTIONS:  1. Assess patient/family's coping skills and  non-compliant behavior (including use of illegal substances)  2. Notify security of behavior or suspected illegal substances which indicate the need for search of the family and/or belongings  3. Encourage verbalization of thoughts and concerns in a socially appropriate manner  4. Utilize positive, consistent limit setting strategies supporting safety of patient, staff and others  5. Encourage participation in the decision making process about the behavioral management agreement  6. If a visitor's behavior poses a threat to safety call refer to organization policy.  7. Initiate consult with , Psychosocial CNS, Spiritual Care as appropriate  Outcome: Progressing     Problem: Psychosis  Goal: Will report no hallucinations or delusions  Description: INTERVENTIONS:  1. Administer medication as  ordered  2. Assist with reality testing to support increasing orientation  3. Assess if patient's hallucinations or delusions are encouraging self harm or harm to others and intervene as appropriate  Outcome: Progressing

## 2024-08-08 NOTE — BH NOTE
Behavioral Health Interdisciplinary Rounds     Patient Name: Isacc Boone Age: 24 y.o. Room/Bed:  308/01  Primary Diagnosis: Bipolar 1 disorder, mixed, severe (HCC)  Admission Status: Voluntary Commitment     Readmission within 30 days: No  Power of  in place: No  Patient requires a blocked bed: No          Reason for blocked bed: n/a    Sleep hours: 8       Participation in Care/Groups:  No  Medication Compliant?: Yes  PRNS (last 24 hours): Sleep Aid    Restraints (last 24 hours):  No  Substance Abuse:  Yes    24 hour chart check complete: Yes    Patient goal(s) for today: Take medications as prescribed, , engage in unit activities, participate in hygiene/ADLs, and communicate needs to appropriate staff,   Treatment team focus/goals: MD adjust medications as appropriate, identify discharge planning needs, coordination of care,     Progress note:   Pt met with treatment team. Pt denies SI/HI/AVH. Pt is pleasant, calm and cooperative. Pt presents with fair mood and affect. Pt reports feeling \"amazing\". Pt presents thought process is more linear. Pt thought content is WNL. Pt is sleeping and eating fairly. Pt is discharged focused but upon understanding blood work needs to be taken tomorrow pt is agreeable to stay.       GIANCARLO to schedule follow up and update mom     1340: SW left messages with various agencies to return call to set up medication management and therapy    1343: Spoke to mom about discharge, no concerns noted    LOS:  2  Expected LOS: 3    Insurance coverage:  Eduardo  Family contact:  Mom, Girlfriend                                Family requesting physician contact today:  No  Discharge plan: Home/Self Care.   Access to weapons: No                                                         Outpatient provider(s): to be linked  Patient's preferred phone number for follow up call: [phone]    Participating treatment team members: Isacc ANDRES Mely, CROW Gandhi, Ann Epps MD

## 2024-08-08 NOTE — BH NOTE
E/M Psychiatric Progress Note    Patient: Isacc Boone MRN: [id number]  SSN: [id number]    YOB: 2000  Age: 24 y.o.  Sex: male      Admit Date: 8/5/2024    LOS: 2 days     Chief Complaint: Sharon    Subjective:     HPI / INTERVAL HISTORY:    The patient, Isacc Boone, is a 24 y.o.  Black /  male with no past psychiatric history (a voiced history of borderline and antisocial personality disorder), who presents at this time with complaints of (and/or evidence of) the following emotional symptoms: suicidal thoughts/threats, agitation, and delusions.  Additional symptomatology include thought process disorganization.  The above symptoms have been present for 2+ weeks. These symptoms are of moderate to high severity. These symptoms are constant in nature.  The patient's condition has been precipitated by psychosocial stressors.  Patient's condition made worse by continued psychoactive drug use and alcohol use as well as treatment noncompliance. UDS: +THC; BAL=82.      The patient presented to the ED with mood lability. He has been having difficulty in all areas of his life, and recently damaged property after a breakup. On the unit, the patient has been elevated, labile and with ongoing agitation. He speaks nonsensically and rapidly but is redirectable. Patient informed of his present status as a TDO and limited insight into his condition. He reports decreased need for sleep, sleeping 4-5 hours per night. The patient denies SI but acknowledges prior attempts in his adolescence.     The patient is a poor historian. The patient does not corroborates the above narrative. The patient contracts for safety on the unit and gives consent for the team to contact collateral. The patient is not amenable to initiating treatment while on the unit. He reports occasional drinking and daily THC use. Labs significant for transaminatis and TAYLOR.    08/07 - the patient has been visible, grandiose and     Creatinine 08/05/2024 1.31 (H)  0.70 - 1.30 MG/DL Final    BUN/Creatinine Ratio 08/05/2024 10 (L)  12 - 20   Final    Est, Glom Filt Rate 08/05/2024 78  >60 ml/min/1.73m2 Final    Calcium 08/05/2024 9.4  8.5 - 10.1 MG/DL Final    Total Bilirubin 08/05/2024 0.7  0.2 - 1.0 MG/DL Final    ALT 08/05/2024 165 (H)  12 - 78 U/L Final    AST 08/05/2024 125 (H)  15 - 37 U/L Final    Alk Phosphatase 08/05/2024 143 (H)  45 - 117 U/L Final    Total Protein 08/05/2024 8.1  6.4 - 8.2 g/dL Final    Albumin 08/05/2024 4.6  3.5 - 5.0 g/dL Final    Globulin 08/05/2024 3.5  2.0 - 4.0 g/dL Final    Albumin/Globulin Ratio 08/05/2024 1.3  1.1 - 2.2   Final    Amphetamine, Urine 08/05/2024 Negative  NEG   Final    Barbiturates, Urine 08/05/2024 Negative  NEG   Final    Benzodiazepines, Urine 08/05/2024 Negative  NEG   Final    Cocaine, Urine 08/05/2024 Negative  NEG   Final    Methadone, Urine 08/05/2024 Negative  NEG   Final    Opiates, Urine 08/05/2024 Negative  NEG   Final    Phencyclidine, Urine 08/05/2024 Negative  NEG   Final    THC, TH-Cannabinol, Urine 08/05/2024 Positive (A)  NEG   Final    Comments: 08/05/2024 (NOTE)   Final    Ethanol Lvl 08/05/2024 82 (H)  <10 MG/DL Final    Salicylate Lvl 08/05/2024 2.0 (L)  2.8 - 20.0 MG/DL Final    Acetaminophen Level 08/05/2024 <2 (L)  10 - 30 ug/mL Final    Color, UA 08/05/2024 YELLOW/STRAW    Final    Appearance 08/05/2024 CLEAR  CLEAR   Final    Specific Gravity, UA 08/05/2024 1.020    Final    pH, Urine 08/05/2024 6.5  5.0 - 8.0   Final    Protein, UA 08/05/2024 TRACE (A)  NEG mg/dL Final    Glucose, Ur 08/05/2024 Negative  NEG mg/dL Final    Ketones, Urine 08/05/2024 TRACE (A)  NEG mg/dL Final    Bilirubin, Urine 08/05/2024 Negative  NEG   Final    Blood, Urine 08/05/2024 Negative  NEG   Final    Urobilinogen, Urine 08/05/2024 1.0  0.2 - 1.0 EU/dL Final    Nitrite, Urine 08/05/2024 Negative  NEG   Final    Leukocyte Esterase, Urine 08/05/2024 Negative  NEG   Final    WBC, UA

## 2024-08-08 NOTE — GROUP NOTE
Group Therapy Note    Date: 8/8/2024    Group Start Time: 0910  Group End Time: 0925  Group Topic: Community Meeting    [facility] 3 ACUTE BEHAV [facility]    Shraddha Courtney        Group Therapy Note    Attendees: 6       Patient's Goal:  To go home today.    Notes:  Patient was pleasant, supportive of others in the group and attentive.     Status After Intervention:  Unchanged    Participation Level: Active Listener and Interactive    Participation Quality: Appropriate, Attentive, Sharing, and Supportive      Speech:  normal      Thought Process/Content: Logical      Affective Functioning: Congruent      Mood:  pleasant, happy      Level of consciousness:  Alert, Oriented x4, and Attentive      Response to Learning: Able to verbalize current knowledge/experience, Able to verbalize/acknowledge new learning, Able to retain information, Capable of insight, Able to change behavior, and Progressing to goal      Endings: None Reported    Modes of Intervention: Education, Support, and Socialization      Discipline Responsible: Behavorial Health Tech      Signature:  SHRADDHA COURTNEY

## 2024-08-08 NOTE — GROUP NOTE
Group Therapy Note    Date: 8/8/2024    Group Start Time: 1100  Group End Time: 1130  Group Topic: Recreational    [facility] 3 ACUTE BEH[facility]    Brianna Geiger        Group Therapy Note    Attendees: 5       Patient's Goal:  To participate in relaxation activity    Notes:  Pt did not attend session    Discipline Responsible: Recreational Therapist      Signature:  BRIANNA GEIGER

## 2024-08-08 NOTE — PLAN OF CARE
0730 At this time morning assessment complete. Patient was encountered in the hallway. [facility] was cooperative with assessment. Eye contact is noted to be fair. Patient currently denied depression, SI, HI and A/V hallucinations, endorsed anxiety. C-SSRS level is no risk. Patient is med compliant. There are no untoward side effects from medications to note. Vital signs within normal parameters. Q 15 minute rounds are being completed to assure patient safety and attend to care needs. Will continue to monitor for safety.     Problem: Discharge Planning  Goal: Discharge to home or other facility with appropriate resources  8/8/2024 0655 by Jim Goodrich RN  Outcome: Progressing     Problem: Behavior  Goal: Pt/Family maintain appropriate behavior and adhere to behavioral management agreement, if implemented  Description: INTERVENTIONS:  1. Assess patient/family's coping skills and  non-compliant behavior (including use of illegal substances)  2. Notify security of behavior or suspected illegal substances which indicate the need for search of the family and/or belongings  3. Encourage verbalization of thoughts and concerns in a socially appropriate manner  4. Utilize positive, consistent limit setting strategies supporting safety of patient, staff and others  5. Encourage participation in the decision making process about the behavioral management agreement  6. If a visitor's behavior poses a threat to safety call refer to organization policy.  7. Initiate consult with , Psychosocial CNS, Spiritual Care as appropriate  8/8/2024 1359 by Staci Wesley, RN  Outcome: Progressing  8/8/2024 0655 by Jim Goodrich RN  Outcome: Progressing     Problem: Psychosis  Goal: Will report no hallucinations or delusions  Description: INTERVENTIONS:  1. Administer medication as  ordered  2. Assist with reality testing to support increasing orientation  3. Assess if patient's hallucinations or delusions are encouraging self

## 2024-08-09 VITALS
SYSTOLIC BLOOD PRESSURE: 118 MMHG | HEIGHT: 71 IN | WEIGHT: 145 LBS | RESPIRATION RATE: 18 BRPM | BODY MASS INDEX: 20.3 KG/M2 | OXYGEN SATURATION: 99 % | HEART RATE: 87 BPM | DIASTOLIC BLOOD PRESSURE: 75 MMHG | TEMPERATURE: 98 F

## 2024-08-09 LAB
-: NORMAL
CHOLEST SERPL-MCNC: 140 MG/DL
EST. AVERAGE GLUCOSE BLD GHB EST-MCNC: 103 MG/DL
GGT SERPL-CCNC: 326 U/L (ref 15–85)
HAV IGM SER QL: NONREACTIVE
HBA1C MFR BLD: 5.2 % (ref 4–5.6)
HBV CORE IGM SER QL: NONREACTIVE
HBV SURFACE AG SER QL: <0.1 INDEX
HBV SURFACE AG SER QL: NEGATIVE
HCV AB SER IA-ACNC: 0.04 INDEX
HCV AB SERPL QL IA: NONREACTIVE
HDLC SERPL-MCNC: 69 MG/DL
HDLC SERPL: 2 (ref 0–5)
LDLC SERPL CALC-MCNC: 58.6 MG/DL (ref 0–100)
TRIGL SERPL-MCNC: 62 MG/DL
TSH SERPL DL<=0.05 MIU/L-ACNC: 1.7 UIU/ML (ref 0.36–3.74)
VALPROATE SERPL-MCNC: 82 UG/ML (ref 50–100)
VLDLC SERPL CALC-MCNC: 12.4 MG/DL

## 2024-08-09 PROCEDURE — 83036 HEMOGLOBIN GLYCOSYLATED A1C: CPT

## 2024-08-09 PROCEDURE — 36415 COLL VENOUS BLD VENIPUNCTURE: CPT

## 2024-08-09 PROCEDURE — 6370000000 HC RX 637 (ALT 250 FOR IP): Performed by: PSYCHIATRY & NEUROLOGY

## 2024-08-09 PROCEDURE — 80164 ASSAY DIPROPYLACETIC ACD TOT: CPT

## 2024-08-09 PROCEDURE — 82977 ASSAY OF GGT: CPT

## 2024-08-09 PROCEDURE — 84443 ASSAY THYROID STIM HORMONE: CPT

## 2024-08-09 PROCEDURE — 6370000000 HC RX 637 (ALT 250 FOR IP)

## 2024-08-09 PROCEDURE — 80074 ACUTE HEPATITIS PANEL: CPT

## 2024-08-09 PROCEDURE — 80061 LIPID PANEL: CPT

## 2024-08-09 PROCEDURE — 99239 HOSP IP/OBS DSCHRG MGMT >30: CPT | Performed by: PSYCHIATRY & NEUROLOGY

## 2024-08-09 RX ORDER — DIVALPROEX SODIUM 500 MG/1
1000 TABLET, EXTENDED RELEASE ORAL
Qty: 60 TABLET | Refills: 1 | Status: SHIPPED | OUTPATIENT
Start: 2024-08-09

## 2024-08-09 RX ORDER — CLONAZEPAM 0.5 MG/1
0.5 TABLET ORAL 2 TIMES DAILY
Qty: 14 TABLET | Refills: 0 | Status: SHIPPED | OUTPATIENT
Start: 2024-08-09 | End: 2024-08-16

## 2024-08-09 RX ORDER — RISPERIDONE 1 MG/1
1 TABLET ORAL 2 TIMES DAILY
Qty: 60 TABLET | Refills: 1 | Status: SHIPPED | OUTPATIENT
Start: 2024-08-09

## 2024-08-09 RX ADMIN — RISPERIDONE 1 MG: 1 SOLUTION ORAL at 08:30

## 2024-08-09 RX ADMIN — VALPROIC ACID 500 MG: 250 SOLUTION ORAL at 08:29

## 2024-08-09 RX ADMIN — LORAZEPAM 1 MG: 2 SOLUTION, CONCENTRATE ORAL at 08:30

## 2024-08-09 RX ADMIN — NICOTINE POLACRILEX 2 MG: 2 LOZENGE ORAL at 08:30

## 2024-08-09 ASSESSMENT — PAIN SCALES - GENERAL: PAINLEVEL_OUTOF10: 0

## 2024-08-09 NOTE — DISCHARGE SUMMARY
PSYCHIATRIC DISCHARGE SUMMARY         IDENTIFICATION:    Patient Name  Isacc Boone   Date of Birth 2000   [facility] [id number]   Medical Record Number  [id number]      Age  24 y.o.   PCP Alena Burnette MD   Admit date:  8/5/2024    Discharge date: 8/9/2024   Room Number  327/02  @ [facility]   Date of Service  8/9/2024            TYPE OF DISCHARGE: RELEASED BY TDO COURT               CONDITION AT DISCHARGE: Improved and Fair       PROVISIONAL & DISCHARGE DIAGNOSES:        Active Hospital Problems    *Bipolar 1 disorder, mixed, severe (HCC)      Psychosis (HCC)        DISCHARGE DIAGNOSIS:   Axis I:  SEE ABOVE  Axis II: SEE ABOVE  Axis III: SEE ABOVE  Axis IV:  lack of structure  Axis V:  20 on admission, 60 on discharge     HISTORY OF PRESENT ILLNESS:  \"Sharon\"    The patient, Isacc Boone, is a 24 y.o.  Black /  male with no past psychiatric history (a voiced history of borderline and antisocial personality disorder), who presents at this time with complaints of (and/or evidence of) the following emotional symptoms: suicidal thoughts/threats, agitation, and delusions.  Additional symptomatology include thought process disorganization.  The above symptoms have been present for 2+ weeks. These symptoms are of moderate to high severity. These symptoms are constant in nature.  The patient's condition has been precipitated by psychosocial stressors.  Patient's condition made worse by continued psychoactive drug use and alcohol use as well as treatment noncompliance. UDS: +THC; BAL=82.      The patient presented to the ED with mood lability. He has been having difficulty in all areas of his life, and recently damaged property after a breakup. On the unit, the patient has been elevated, labile and with ongoing agitation. He speaks nonsensically and rapidly but is redirectable. Patient informed of his present status as a TDO and limited insight into his condition. He reports  Urine 08/05/2024 Negative  NEG   Final    Blood, Urine 08/05/2024 Negative  NEG   Final    Urobilinogen, Urine 08/05/2024 1.0  0.2 - 1.0 EU/dL Final    Nitrite, Urine 08/05/2024 Negative  NEG   Final    Leukocyte Esterase, Urine 08/05/2024 Negative  NEG   Final    WBC, UA 08/05/2024 0-4  0 - 4 /hpf Final    RBC, UA 08/05/2024 0-5  0 - 5 /hpf Final    Epithelial Cells, UA 08/05/2024 FEW  FEW /lpf Final    BACTERIA, URINE 08/05/2024 Negative  NEG /hpf Final    Urine Culture if Indicated 08/05/2024 CULTURE NOT INDICATED BY UA RESULT  CNI   Final    TSH, 3rd Generation 08/09/2024 1.70  0.36 - 3.74 uIU/mL Final    Valproic Acid 08/09/2024 82  50 - 100 ug/ml Final     No results found.                DISPOSITION:    Home. Patient to f/u with psychiatric and psychotherapy appointments. Patient is to f/u with all outpatient treatment as directed, including psychiatric, medical, and social appointments.               FOLLOW-UP CARE:    Follow-up Information       Follow up With Specialties Details Why Contact Info    Deny Hunter MD Hepatology, Gastroenterology   [address] [place] [address]  [phone]      [facility] Support Services  Follow up Same Day Access program is the initial point of contact for information and service requests for a wide range of mental health services and programs. Our services include:     Information and referral     Linking to treatment providers     Referrals to other agencies and community partners     For more information, call our Intake staff at [phone].     Intake Hours     Monday-Friday: 8 a.m. - 4:30 p.m.     Assessment Hours     Monday: 8 a.m. - 2 p.m.     Tuesday: 8 a.m. - 4 p.m.     Wednesday: 8 a.m. - 4 p.m.     Thursday: 8 a.m. - 4 p.m.     Friday: 8 a.m. - 2 p.m.     Please Bring:   Photo ID   Social security number   Insurance card   Applicable co-payment   Proof of all forms of household income   List of all medications, dosages

## 2024-08-09 NOTE — GROUP NOTE
Group Therapy Note    Date: 8/9/2024    Group Start Time: 1000  Group End Time: 1115  Group Topic: Relaxation    [facility] 3 ACUTE BEHAV [facility]    Brianna Geiger        Group Therapy Note    Attendees: 5       Patient's Goal:  To participate in relaxation activity      Status After Intervention:  Improved    Participation Level: Active Listener and Interactive    Participation Quality: Appropriate, Attentive, Sharing, and Supportive      Speech:  normal      Thought Process/Content: Logical      Affective Functioning: Congruent      Mood: euthymic      Level of consciousness:  Alert, Oriented x4, and Attentive      Response to Learning: Progressing to goal      Endings: None Reported    Modes of Intervention: Activity      Discipline Responsible: Recreational Therapist      Signature:  BRIANNA GEIGER

## 2024-08-09 NOTE — PROGRESS NOTES
Pharmacist Discharge Medication Reconciliation    Discharging Provider: Supriya       Discharge Medications:   Current Discharge Medication List        START taking these medications    Details   clonazePAM (KLONOPIN) 0.5 MG tablet Take 1 tablet by mouth 2 times daily for 7 days. Max Daily Amount: 1 mg  Qty: 14 tablet, Refills: 0    Associated Diagnoses: Bipolar 1 disorder, mixed, severe (HCC)      divalproex (DEPAKOTE ER) 500 MG extended release tablet Take 2 tablets by mouth nightly  Qty: 60 tablet, Refills: 1      risperiDONE (RISPERDAL) 1 MG tablet Take 1 tablet by mouth 2 times daily  Qty: 60 tablet, Refills: 1             The patient's chart, MAR and AVS were reviewed by Shantel Roldan RPH.

## 2024-08-09 NOTE — BH NOTE
Pt is alert and oriented x person, place and time. Pt denies any SI/HI or AV hallucinations. Pt denies any depression. Pt plans to return home. Discharge information reviewed with patient. Pt verbalizes understanding. Pt's belongings/valuables returned. Pt to be transported home by mother.

## 2024-08-09 NOTE — BH NOTE
Behavioral Health Transition Record    Patient Name: Isacc Boone  YOB: 2000   Medical Record Number: [id number]  Date of Admission: 8/5/2024  4:29 PM   Date of Discharge: 8/4/2024    Attending Provider: Ann Epps, *   Discharging Provider: Ann Epps MD   To contact this individual call [phone] and ask the  to page.  If unavailable, ask to be transferred to Behavioral Health Provider on call.  A Behavioral Health Provider will be available on call 24/7 and during holidays.    Primary Care Provider: Alena Burnette MD    Allergies   Allergen Reactions    Coconut (Cocos Nucifera)     Other      Tree nuts    Amoxicillin-Pot Clavulanate Nausea And Vomiting       Reason for Admission: The patient, Isacc Boone, is a 24 y.o.  Black /  male with no past psychiatric history (a voiced history of borderline and antisocial personality disorder), who presents at this time with complaints of (and/or evidence of) the following emotional symptoms: suicidal thoughts/threats, agitation, and delusions.  Additional symptomatology include thought process disorganization.  The above symptoms have been present for 2+ weeks. These symptoms are of moderate to high severity. These symptoms are constant in nature.  The patient's condition has been precipitated by psychosocial stressors.  Patient's condition made worse by continued psychoactive drug use and alcohol use as well as treatment noncompliance. UDS: +THC; BAL=82.      The patient presented to the ED with mood lability. He has been having difficulty in all areas of his life, and recently damaged property after a breakup. On the unit, the patient has been elevated, labile and with ongoing agitation. He speaks nonsensically and rapidly but is redirectable. Patient informed of his present status as a TDO and limited insight into his condition. He reports decreased need for sleep, sleeping 4-5 hours per night.  a less restrictive environment.  Patient's level of functioning is improving.  No assaultive/destructive behavior has been observed for the past 24 hours.  No suicidal/homicidal threat or behavior has been observed for the past 24 hours.  There is no evidence of serious medication side effects.  Patient has not been in physical or protective restraints for at least the past 24 hours.    If weapons involved, how are they secured? No weapons    Is patient aware of and in agreement with discharge plan? Yes    Arrangements for medication:  Prescriptions sent to patient preferred pharmacy    Copy of discharge instructions to provider?:  Yes    Arrangements for transportation home:  Family    Discharge Medication List and Instructions:      Medication List        START taking these medications      clonazePAM 0.5 MG tablet  Commonly known as: KlonoPIN  Take 1 tablet by mouth 2 times daily for 7 days. Max Daily Amount: 1 mg     divalproex 500 MG extended release tablet  Commonly known as: Depakote ER  Take 2 tablets by mouth nightly     risperiDONE 1 MG tablet  Commonly known as: RisperDAL  Take 1 tablet by mouth 2 times daily               Where to Get Your Medications        These medications were sent to [facility]/pharmacy #[id number] - [place], [place] - [address] - P [phone] - F [phone]  [address] [facility] [address]      Phone: [phone]   clonazePAM 0.5 MG tablet  divalproex 500 MG extended release tablet  risperiDONE 1 MG tablet         Unresulted Labs (24h ago, onward)      None            To obtain results of studies pending at discharge, please contact [phone] opt [phone]    Follow-up Information       Follow up With Specialties Details Why Contact Info    Deny Hunter MD Hepatology, Gastroenterology   [address] [address] [phone]      [facility]  Follow up Same Day Access program is the initial point

## 2024-08-09 NOTE — PLAN OF CARE
Problem: Psychosis  Goal: Will report no hallucinations or delusions  Description: INTERVENTIONS:  1. Administer medication as  ordered  2. Assist with reality testing to support increasing orientation  3. Assess if patient's hallucinations or delusions are encouraging self harm or harm to others and intervene as appropriate  Outcome: Progressing     Problem: Sharon  Goal: Will exhibit normal sleep and speech and no impulsivity  Description: INTERVENTIONS:  1. Administer medication as ordered  2. Set limits on impulsive behavior  3. Make attempts to decrease external stimuli as possible  Outcome: Progressing

## 2024-08-09 NOTE — PLAN OF CARE
Problem: Discharge Planning  Goal: Discharge to home or other facility with appropriate resources  8/9/2024 0825 by Leigh Ardon, RN  Outcome: Completed  8/9/2024 0822 by Leigh Ardon, RN  Outcome: Progressing     Problem: Pain  Goal: Verbalizes/displays adequate comfort level or baseline comfort level  Outcome: Completed     Problem: Behavior  Goal: Pt/Family maintain appropriate behavior and adhere to behavioral management agreement, if implemented  Description: INTERVENTIONS:  1. Assess patient/family's coping skills and  non-compliant behavior (including use of illegal substances)  2. Notify security of behavior or suspected illegal substances which indicate the need for search of the family and/or belongings  3. Encourage verbalization of thoughts and concerns in a socially appropriate manner  4. Utilize positive, consistent limit setting strategies supporting safety of patient, staff and others  5. Encourage participation in the decision making process about the behavioral management agreement  6. If a visitor's behavior poses a threat to safety call refer to organization policy.  7. Initiate consult with , Psychosocial CNS, Spiritual Care as appropriate  Outcome: Completed     Problem: Drug Abuse/Detox  Goal: Will have no detox symptoms and will verbalize plan for changing drug-related behavior  Description: INTERVENTIONS:  1. Administer medication as ordered  2. Monitor physical status  3. Provide emotional support with 1:1 interaction with staff  4. Encourage  recovery focused treatment   Outcome: Completed     Problem: Sharon  Goal: Will exhibit normal sleep and speech and no impulsivity  Description: INTERVENTIONS:  1. Administer medication as ordered  2. Set limits on impulsive behavior  3. Make attempts to decrease external stimuli as possible  8/9/2024 0825 by Leigh Ardon, RN  Outcome: Completed  8/9/2024 0106 by Maggie Bautista RN  Outcome: Progressing     Problem:  Psychosis  Goal: Will report no hallucinations or delusions  Description: INTERVENTIONS:  1. Administer medication as  ordered  2. Assist with reality testing to support increasing orientation  3. Assess if patient's hallucinations or delusions are encouraging self harm or harm to others and intervene as appropriate  8/9/2024 0825 by Leigh Ardon, RN  Outcome: Completed  8/9/2024 0106 by Maggie Bautista, RN  Outcome: Progressing     Problem: Self Care Deficit  Goal: Return ADL status to a safe level of function  Description: INTERVENTIONS:  1. Administer medication as ordered  2. Assess ADL deficits and provide assistive devices as needed  3. Obtain PT/OT consults as needed  4. Assist and instruct patient to increase activity and self care as tolerated  Outcome: Completed

## 2024-08-09 NOTE — BH NOTE
Assumed care of the patient. Patient was visible on the milieu. Over all he was friendly and pleasant. Patient was cooperative with the assessments and able to make his needs known. Patient denied S.I/H.I/A/V/H, anxiety and depression. He was medication and meal compliant. Alert and oriented.No PRN medication requested or needed. Patient appeared to be sleeping for about 7 hours. Lab work completed this morning.

## 2024-08-09 NOTE — DISCHARGE INSTRUCTIONS
to support their residents. They also operate affordable senior living communities. There is a different  for each community. Contact information varies for each unit and is included with the property listing on the website. Contact them directly to learn more about each unit and its availability.        [facility]    [address] [place] [address], [phone]    Website: [email or web address]/    Hours of Operation: 9:00 AM - 4:00 PM    Service Area: Rental units available in the [facility].    Notes: [employer] owns and operates residential housing. Income limit is $26,000. Units include a private room with shared living space. Cost is $450 a month with all utilities included. Housing units are not co-ed. Applications can be found at [facility]'s office (also a [employer] store) at the counter.        [facility]    [address] [place] [address] [address], [place] [address], [phone]    E-mail: [email or web address], Website: MyEveTab    Hours of Operation: Monday - Friday 9:00 AM - 5:00 PM    Service Area: [facility] and [facility]    Notes: [facility] is a [facility] approved housing counseling organization. They provide homebuyer education, money management and credit recovery classes, pre-purchase counseling, down payment assistance to qualifying individuals, foreclosure prevention counseling, and tenant workshops for renters. For counseling services, intake forms can be downloaded from [facility]'s website. Call or email to learn more about class and workshop offerings. Can provide services in Persian.        [facility]    [address] [place] [address], [phone] (p), [phone] (f)    E-mail: info@capup.org, Website: [email or web address]org    Point of Contact: Lainey Levy    Hours of Operation: Monday Friday 9:00 AM -- 5:00 PM    Service Area: [facility] [place]     Notes: [facility] aids low-income individuals and families with emergency rent and mortgage payments. Emergency services are provided by appointment only. An application must be prepared prior to the appointment. Services are offered on a limited basis. Call for more information.        [address] [address], [phone] (main), [phone] (crisis line)    E-mail: [email or web address], Website: [email or web address]    Point of Contact: Nanette Colon    Hours of Operation: 24 Hour Housing    Service Area: [place]    Notes: Community  work with single adults and families who are in a housing crisis to ensure they receive all the services they need to transition into permanent housing.

## 2024-08-09 NOTE — PROGRESS NOTES
Laboratory Monitoring for Valproic Acid    This patient is currently prescribed the following medication(s):   Current Facility-Administered Medications: acetaminophen (TYLENOL) tablet 650 mg, 650 mg, Oral, Q4H PRN  polyethylene glycol (GLYCOLAX) packet 17 g, 17 g, Oral, Daily PRN  aluminum & magnesium hydroxide-simethicone (MAALOX) 200-200-20 MG/5ML suspension 30 mL, 30 mL, Oral, Q6H PRN  hydrOXYzine HCl (ATARAX) tablet 50 mg, 50 mg, Oral, TID PRN  haloperidol (HALDOL) tablet 5 mg, 5 mg, Oral, Q4H PRN **OR** haloperidol lactate (HALDOL) injection 5 mg, 5 mg, IntraMUSCular, Q4H PRN  diphenhydrAMINE (BENADRYL) injection 50 mg, 50 mg, IntraMUSCular, Q4H PRN  traZODone (DESYREL) tablet 50 mg, 50 mg, Oral, Nightly PRN  nicotine polacrilex (COMMIT) lozenge 2 mg, 2 mg, Oral, Q1H PRN  risperiDONE (RisperDAL) 1 MG/ML oral solution 1 mg, 1 mg, Oral, BID  LORazepam (ATIVAN) 2 MG/ML concentrated solution 1 mg, 1 mg, Oral, BID  valproic acid (DEPAKENE) 250 MG/5ML oral solution 500 mg, 500 mg, Oral, BID    The following labs have been completed for monitoring of valproic acid:    Valproic Acid Serum Concentration  Lab Results   Component Value Date/Time    VALAC 82 08/09/2024 05:47 AM         Hepatic Function  Lab Results   Component Value Date/Time    BILITOT 0.7 08/05/2024 05:47 PM    GLOB 3.5 08/05/2024 05:47 PM    ALBUMIN 4.6 08/05/2024 05:47 PM     08/05/2024 05:47 PM     08/05/2024 05:47 PM    ALKPHOS 143 08/05/2024 05:47 PM       Hematology  Lab Results   Component Value Date/Time    WBC 4.6 08/05/2024 05:47 PM    RBC 5.21 08/05/2024 05:47 PM    HGB 14.7 08/05/2024 05:47 PM    HCT 42.4 08/05/2024 05:47 PM    MCV 81.4 08/05/2024 05:47 PM    MCH 28.2 08/05/2024 05:47 PM    MCHC 34.7 08/05/2024 05:47 PM    RDW 12.9 08/05/2024 05:47 PM     08/05/2024 05:47 PM       Assessment/Plan:  Valproic acid level obtained on 8/9/24 is within therapeutic limits at 82 mcg/mL (range  mcg/mL) on dose of 500 mg BID

## 2024-10-04 ENCOUNTER — HOSPITAL ENCOUNTER (EMERGENCY)
Facility: HOSPITAL | Age: 24
Discharge: HOME | End: 2024-10-04
Attending: EMERGENCY MEDICINE
Payer: COMMERCIAL

## 2024-10-04 VITALS
HEART RATE: 81 BPM | SYSTOLIC BLOOD PRESSURE: 143 MMHG | DIASTOLIC BLOOD PRESSURE: 94 MMHG | BODY MASS INDEX: 19.6 KG/M2 | RESPIRATION RATE: 18 BRPM | TEMPERATURE: 99.1 F | OXYGEN SATURATION: 99 % | WEIGHT: 140 LBS | HEIGHT: 71 IN

## 2024-10-04 DIAGNOSIS — F48.9 MENTAL HEALTH PROBLEM: Primary | ICD-10-CM

## 2024-10-04 LAB
ALBUMIN SERPL-MCNC: 4.6 G/DL (ref 3.5–5)
ALBUMIN/GLOB SERPL: 1.3 (ref 1.1–2.2)
ALP SERPL-CCNC: 68 U/L (ref 45–117)
ALT SERPL-CCNC: 28 U/L (ref 12–78)
ANION GAP SERPL CALC-SCNC: 4 MMOL/L (ref 2–12)
AST SERPL-CCNC: 27 U/L (ref 15–37)
BASOPHILS # BLD: 0 K/UL (ref 0–0.1)
BASOPHILS NFR BLD: 0 % (ref 0–1)
BILIRUB SERPL-MCNC: 0.8 MG/DL (ref 0.2–1)
BUN SERPL-MCNC: 13 MG/DL (ref 6–20)
BUN/CREAT SERPL: 8 (ref 12–20)
CALCIUM SERPL-MCNC: 10 MG/DL (ref 8.5–10.1)
CHLORIDE SERPL-SCNC: 106 MMOL/L (ref 97–108)
CO2 SERPL-SCNC: 29 MMOL/L (ref 21–32)
CREAT SERPL-MCNC: 1.61 MG/DL (ref 0.7–1.3)
DIFFERENTIAL METHOD BLD: NORMAL
EOSINOPHIL # BLD: 0 K/UL (ref 0–0.4)
EOSINOPHIL NFR BLD: 0 % (ref 0–7)
ERYTHROCYTE [DISTWIDTH] IN BLOOD BY AUTOMATED COUNT: 12.2 % (ref 11.5–14.5)
ETHANOL SERPL-MCNC: <10 MG/DL (ref 0–0.08)
GLOBULIN SER CALC-MCNC: 3.6 G/DL (ref 2–4)
GLUCOSE SERPL-MCNC: 110 MG/DL (ref 65–100)
HCT VFR BLD AUTO: 42.2 % (ref 36.6–50.3)
HGB BLD-MCNC: 14.3 G/DL (ref 12.1–17)
IMM GRANULOCYTES # BLD AUTO: 0 K/UL (ref 0–0.04)
IMM GRANULOCYTES NFR BLD AUTO: 0 % (ref 0–0.5)
LYMPHOCYTES # BLD: 1.1 K/UL (ref 0.8–3.5)
LYMPHOCYTES NFR BLD: 22 % (ref 12–49)
MCH RBC QN AUTO: 28.7 PG (ref 26–34)
MCHC RBC AUTO-ENTMCNC: 33.9 G/DL (ref 30–36.5)
MCV RBC AUTO: 84.6 FL (ref 80–99)
MONOCYTES # BLD: 0.4 K/UL (ref 0–1)
MONOCYTES NFR BLD: 8 % (ref 5–13)
NEUTS SEG # BLD: 3.5 K/UL (ref 1.8–8)
NEUTS SEG NFR BLD: 70 % (ref 32–75)
NRBC # BLD: 0 K/UL (ref 0–0.01)
NRBC BLD-RTO: 0 PER 100 WBC
PLATELET # BLD AUTO: 288 K/UL (ref 150–400)
PMV BLD AUTO: 9.8 FL (ref 8.9–12.9)
POTASSIUM SERPL-SCNC: 3.5 MMOL/L (ref 3.5–5.1)
PROT SERPL-MCNC: 8.2 G/DL (ref 6.4–8.2)
RBC # BLD AUTO: 4.99 M/UL (ref 4.1–5.7)
SODIUM SERPL-SCNC: 139 MMOL/L (ref 136–145)
WBC # BLD AUTO: 5 K/UL (ref 4.1–11.1)

## 2024-10-04 PROCEDURE — 85025 COMPLETE CBC W/AUTO DIFF WBC: CPT

## 2024-10-04 PROCEDURE — 90791 PSYCH DIAGNOSTIC EVALUATION: CPT

## 2024-10-04 PROCEDURE — 36415 COLL VENOUS BLD VENIPUNCTURE: CPT

## 2024-10-04 PROCEDURE — 82077 ASSAY SPEC XCP UR&BREATH IA: CPT

## 2024-10-04 PROCEDURE — 80053 COMPREHEN METABOLIC PANEL: CPT

## 2024-10-04 PROCEDURE — 99283 EMERGENCY DEPT VISIT LOW MDM: CPT

## 2024-10-04 RX ORDER — RISPERIDONE 1 MG/1
1 TABLET ORAL 2 TIMES DAILY
Qty: 60 TABLET | Refills: 0 | Status: SHIPPED | OUTPATIENT
Start: 2024-10-04

## 2024-10-04 ASSESSMENT — PAIN - FUNCTIONAL ASSESSMENT: PAIN_FUNCTIONAL_ASSESSMENT: 0-10

## 2024-10-04 ASSESSMENT — PAIN SCALES - GENERAL: PAINLEVEL_OUTOF10: 0

## 2024-10-04 NOTE — ED PROVIDER NOTES
[facility] EMERGENCY DEPT  EMERGENCY DEPARTMENT ENCOUNTER      Date: 10/4/2024  Patient Name: Isacc Boone  MRN: [id number]  Birthdate 2000  Date of evaluation: 10/4/2024  Provider: ANICETO Todd NP   Note Started: 6:50 PM EDT 10/4/24    CHIEF COMPLAINT     Chief Complaint   Patient presents with    Mental Health Problem       HISTORY OF PRESENT ILLNESS  (Onset, Location, Duration, Character, Alleviating/Aggravating, Radiation, Timing, Severity)   Note limiting factors.   History Provided By: Patient and mother    HPI: Isacc Boone is a 24 y.o. male with a history of bipolar presents with request for medication, risperidone.  States that he has been \"tweaking\" lately and feeling out of control.  States he has been attempting to switch psychiatric provider since his previous admission to her [facility] 2 months ago.  His mom states they called the referral numbers given to them upon discharge and the numbers were out of service.  Patient states history of drinking but none recently.  Denies current SI/HI/self-harm.  Daily marijuana use.    Nursing Notes and triage vitals were reviewed.  PCP: Alena Burnette MD      PAST MEDICAL HISTORY   Past Medical History:  No past medical history on file.    Past Surgical History:  No past surgical history on file.    Family History:  No family history on file.    Social History:  Social History     Tobacco Use    Smoking status: Never    Smokeless tobacco: Current   Substance Use Topics    Alcohol use: Yes    Drug use: Yes     Types: Marijuana (Weed)       Allergies:  Allergies   Allergen Reactions    Coconut (Cocos Nucifera)     Other      Tree nuts    Amoxicillin-Pot Clavulanate Nausea And Vomiting       Current Meds:   No current facility-administered medications for this encounter.     Current Outpatient Medications   Medication Sig Dispense Refill    risperiDONE (RISPERDAL) 1 MG tablet Take 1 tablet by mouth 2 times daily 60 tablet 0     applicable    CONSULTS:  [facility]    Patient was given the following medications:  Medications - No data to display    Social Determinants affecting Dx or Tx: None    Smoking Cessation: Not Applicable    Records Reviewed (source and summary of external notes): Prior medical records and Nursing notes.     CLINICAL DECISION TOOLS                   PROCEDURES   Unless otherwise noted above, none  Procedures      CRITICAL CARE TIME   Patient does not meet Critical Care Time, 0 minutes    FINAL IMPRESSION     1. Mental health problem          DISPOSITION / PLAN     DISPOSITION Decision To Discharge 10/04/2024 10:09:13 PM  Condition at Disposition: Data Unavailable    Discharge Note: The patient is stable for discharge home. The signs, symptoms, diagnosis, and discharge instructions have been discussed, understanding conveyed, and agreed upon. The patient is to follow up as recommended or return to ER should their symptoms worsen.     Prescription Drug Management Considerations: hx of suicidal ideation    PATIENT REFERRED TO:  [facility] EMERGENCY DEPT  [address] [place] [address]  [phone]    If symptoms worsen    Utilize resources provided by [facility] for mental health follow-up as discussed.            DISCHARGE MEDICATIONS:  New Prescriptions    RISPERIDONE (RISPERDAL) 1 MG TABLET    Take 1 tablet by mouth 2 times daily         (Please note that parts of this dictation were completed with voice recognition software. Quite often unanticipated grammatical, syntax, homophones, and other interpretive errors are inadvertently transcribed by the computer software. Efforts were made to edit the dictation but occasionally words remain mis-transcribed.)    ANICETO Todd NP (electronically signed)  Emergency Attending Physician / Physician Assistant / Nurse Practitioner     Ping Bass APRN - NP  10/04/24 7023

## 2024-10-04 NOTE — ED TRIAGE NOTES
Pt arrives to the ED, pt reports he has Bipolar disorder and is requesting Buspirone, reports he is told by others that he is being \"mean\" and \"sadistic\", hasn't been able to get medications. Mother reports they've been trying to get appointments to get a prescription but have been unsuccessful.

## 2024-10-05 NOTE — BSMART NOTE
BSMART assessment completed, and suicide risk level noted to be moderate, however this is only due to a previous suicide attempt over 10 years ago. Primary Nurse Kevin and NP Ping notified. Concerns not observed.

## 2024-10-05 NOTE — BSMART NOTE
Comprehensive Assessment Form Part 1    Section I - Disposition    Primary Diagnosis: ***  Secondary Diagnosis:     The Medical Doctor to Psychiatrist conference {WAS/NOT:92842} completed.  The Medical Doctor is in agreement with Psychiatrist disposition because of (reason) ***.  The plan is ***.  The on-call Psychiatrist consulted was  ***.  The admitting Psychiatrist will be  ***.  The admitting Diagnosis is ***.  The Payor source is ***.  The name of the representative was ***.  This was {[facility] APPROVED/NOT APPROVED:40991}.  This writer reviewed the Ray Suicide Severity Rating Scale in nursing flowsheet and the risk level assigned is ***.  Based on this assessment, the risk of suicide is *** and the plan is ***.    Section II - Integrated Summary  Summary:  ***    The patient {HAS/HAS NOT:64602311} demonstrated mental capacity to provide informed consent.  The information is given by the {INFORMATION SOURCE:80292}.  The Chief Complaint is ***.  The Precipitant Factors are ***.  Previous Hospitalizations: ***  The patient {HAS BEEN IN RESTRAINTS:35566}.  Current Psychiatrist and/or  is ***.    Lethality Assessment:    The potential for suicide is noted by the following: {[facility] SUICIDE POTENTIAL CHOICES:57901}.  The potential for homicide is {NOTED:75557}.  The patient {HAS/HAS NOT:96279075} been a perpetrator of sexual or physical abuse.  There {ARE/ARE NOT PENDING CHARGES:38884}  The patient {IS/IS NOT:19932} felt to be at risk for self harm or harm to others.  The attending nurse was advised {[facility] HARM TO SELF OR OTHERS:43353}.    Section III - Psychosocial  The patient's overall mood and attitude is ***.  Feelings of helplessness and hopelessness are {OBSERVED/NOT OBSERVED:17919}.  Generalized anxiety is {OBSERVED/NOT OBSERVED:44771}.  Panic is {OBSERVED/NOT OBSERVED:14763}. Phobias are {OBSERVED/NOT OBSERVED:99969}.  Obsessive compulsive tendencies are {OBSERVED/NOT OBSERVED:76259}.       Section IV - Mental Status Exam  The patient's appearance {APPEARANCE:30776}.  The patient's behavior {BEHAVIOR:39818}. The patient is {ORIENTATION:05413}.  The patient's speech {SPEECH:94672}.  The patient's mood {MOOD BH:34238}.  The range of affect {RANGE OF AFFECT:85867}.  The patient's thought content demonstrates {THOUGHT CONTENT:14335} .  The thought process {THOUGHT PROCESS:46247}.  The patient's perception {PERCEPTION:72620}. The patient's memory {MEMORY BH:39336}.  The patient's appetite {APPETITIE:69793} .  The patient's sleep {SLEEP BH:54295}. {INSIGHT:28301}.  The patient's judgement {JUDGEMENT:84015}.      Section V - Substance Abuse  The patient {IS/IS NOT:84737} using substances.  The patient is using {[facility] SUBSTANCE TYPE WITH ROUTE:75851}. The patient has experienced the following withdrawal symptoms: {SUBSTANCE ABUSE SYMPTOMS:08239}.    Section VI - Living Arrangements  The patient {MARITAL STATUS:97065}.  The patient lives {CAREGIVER:79053}. The patient has {children:638627332}.  The patient {DOES/DOES NOT/WILD CARD (D):95247} plan to return home upon discharge.  The patient {DOES/DOES NOT/WILD CARD (D):98475} have legal issues pending. The patient's source of income comes from {INCOME SOURCE:18451}.  Quaker and cultural practices {CULTURAL/Church PRACTICES:16033}    The patient's greatest support comes from *** and this person {will/will not:0777022466} be involved with the treatment.    The patient {HAS/HAS NOT:82037199} been in an event described as horrible or outside the realm of ordinary life experience either currently or in the past.  The patient {HAS/HAS NOT:90041141} been a victim of sexual/physical abuse.    Section VII - Other Areas of Clinical Concern  The highest grade achieved is *** with the overall quality of school experience being described as ***.  The patient is currently {EMPLOYED:06449} and speaks {LANGUAGE:19979} as a primary language.  The patient has {IMPAIRED

## 2024-10-05 NOTE — DISCHARGE INSTRUCTIONS
Thank you for allowing us to provide you with medical care today.  We realize that you have many choices for your emergency care needs.  We thank you for choosing [facility].  Please choose us in the future for any continued health care needs.     We hope we addressed all of your medical concerns. We strive to provide excellent quality care in the Emergency Department.  Anything less than excellent does not meet our expectations.     The exam and treatment you received in the Emergency Department were for an emergent problem and are not intended as complete care. It is important that you follow up with a doctor, nurse practitioner, or physician’s assistant for ongoing care. If your symptoms worsen or you do not improve as expected and you are unable to reach your usual health care provider, you should return to the Emergency Department. We are available 24 hours a day.     Take this sheet with you when you go to your follow-up visit.     If you have any problem arranging the follow-up visit, contact the Emergency Department immediately.     Make an appointment your family doctor for follow up of this visit. Return to the ER if you are unable to be seen in a timely manner.     Below is a summary of your results.    Labs  Recent Results (from the past 12 hour(s))   CBC with Auto Differential    Collection Time: 10/04/24  7:00 PM   Result Value Ref Range    WBC 5.0 4.1 - 11.1 K/uL    RBC 4.99 4.10 - 5.70 M/uL    Hemoglobin 14.3 12.1 - 17.0 g/dL    Hematocrit 42.2 36.6 - 50.3 %    MCV 84.6 80.0 - 99.0 FL    MCH 28.7 26.0 - 34.0 PG    MCHC 33.9 30.0 - 36.5 g/dL    RDW 12.2 11.5 - 14.5 %    Platelets 288 150 - 400 K/uL    MPV 9.8 8.9 - 12.9 FL    Nucleated RBCs 0.0 0  WBC    nRBC 0.00 0.00 - 0.01 K/uL    Neutrophils % 70 32 - 75 %    Lymphocytes % 22 12 - 49 %    Monocytes % 8 5 - 13 %    Eosinophils % 0 0 - 7 %    Basophils % 0 0 - 1 %    Immature Granulocytes % 0 0.0 - 0.5 %    Neutrophils

## 2024-10-05 NOTE — ED NOTES
Spoke to BSMART at this time, Alena with BSMART informed me that she will call back in 45 minutes.